# Patient Record
Sex: MALE | Race: WHITE | NOT HISPANIC OR LATINO | Employment: FULL TIME | URBAN - METROPOLITAN AREA
[De-identification: names, ages, dates, MRNs, and addresses within clinical notes are randomized per-mention and may not be internally consistent; named-entity substitution may affect disease eponyms.]

---

## 2018-11-20 ENCOUNTER — OFFICE VISIT (OUTPATIENT)
Dept: NEUROSURGERY | Facility: CLINIC | Age: 38
End: 2018-11-20

## 2018-11-20 VITALS
SYSTOLIC BLOOD PRESSURE: 130 MMHG | TEMPERATURE: 97.6 F | BODY MASS INDEX: 33.06 KG/M2 | WEIGHT: 223.2 LBS | HEIGHT: 69 IN | DIASTOLIC BLOOD PRESSURE: 88 MMHG

## 2018-11-20 DIAGNOSIS — R20.0 NUMBNESS AND TINGLING OF BOTH LEGS: ICD-10-CM

## 2018-11-20 DIAGNOSIS — R20.0 NUMBNESS OF FINGERS OF BOTH HANDS: Primary | ICD-10-CM

## 2018-11-20 DIAGNOSIS — R20.2 NUMBNESS AND TINGLING OF BOTH LEGS: ICD-10-CM

## 2018-11-20 PROCEDURE — 99203 OFFICE O/P NEW LOW 30 MIN: CPT | Performed by: NEUROLOGICAL SURGERY

## 2018-11-20 RX ORDER — PANTOPRAZOLE SODIUM 40 MG/1
40 TABLET, DELAYED RELEASE ORAL DAILY
COMMUNITY

## 2018-11-20 RX ORDER — ATOMOXETINE 80 MG/1
80 CAPSULE ORAL DAILY
COMMUNITY
End: 2019-02-04

## 2018-11-20 NOTE — PROGRESS NOTES
Eduardo Riley  1980  [id number]      No chief complaint on file.      HISTORY OF PRESENT ILLNESS:  This is a 37-year-old male who has a 9 month history of numbness and tingling in his upper and lower extremities.  He has pain in his hips but has no symptoms consistent with radiculopathy either in the upper or lower extremities.  He has no bowel bladder dysfunction.  He notes is sitting and standing for long periods of time tend to exacerbate his symptoms.  Cervical and lumbar MRI been performed and  he is referred for neurosurgical consultation.     Past Medical History:   Diagnosis Date   • Anxiety    • Environmental allergies    • Hypertension        Past Surgical History:   Procedure Laterality Date   • CHOLECYSTECTOMY     • TONSILLECTOMY         Family History   Problem Relation Age of Onset   • No Known Problems Mother    • Hypertension Father        Social History     Socioeconomic History   • Marital status: Single     Spouse name: Not on file   • Number of children: Not on file   • Years of education: Not on file   • Highest education level: Not on file   Social Needs   • Financial resource strain: Not on file   • Food insecurity - worry: Not on file   • Food insecurity - inability: Not on file   • Transportation needs - medical: Not on file   • Transportation needs - non-medical: Not on file   Occupational History   • Not on file   Tobacco Use   • Smoking status: Former Smoker   Substance and Sexual Activity   • Alcohol use: Yes     Alcohol/week: 10.8 oz     Types: 18 Cans of beer per week   • Drug use: No   • Sexual activity: Not on file   Other Topics Concern   • Not on file   Social History Narrative   • Not on file       No Known Allergies      Current Outpatient Medications:   •  atomoxetine (STRATTERA) 80 MG capsule, Take 80 mg by mouth Daily., Disp: , Rfl:   •  HYDROXYZINE PAMOATE PO, Take  by mouth., Disp: , Rfl:   •  loratadine (CLARITIN) 10 MG tablet, Take 10 mg by mouth Daily., Disp: , Rfl:    •  pantoprazole (PROTONIX) 40 MG EC tablet, Take 40 mg by mouth Daily., Disp: , Rfl:   •  valsartan (DIOVAN) 160 MG tablet, Take 160 mg by mouth Daily., Disp: , Rfl:   •  busPIRone (BUSPAR) 15 MG tablet, Take 15 mg by mouth 3 (Three) Times a Day., Disp: , Rfl:   •  escitalopram (LEXAPRO) 20 MG tablet, Take 20 mg by mouth Daily., Disp: , Rfl:   •  HYDROcodone-acetaminophen (NORCO) 5-325 MG per tablet, 1 po q 4-6 hrs prn pain, Disp: 20 tablet, Rfl: 0  •  MethylPREDNISolone (MEDROL, MILLY,) 4 MG tablet, Take as directed on package instructions., Disp: 21 tablet, Rfl: 0    Review of Systems   Constitutional: Negative for activity change, appetite change, chills, diaphoresis, fatigue, fever and unexpected weight change.   HENT: Negative for congestion, dental problem, drooling, ear discharge, ear pain, facial swelling, hearing loss, mouth sores, nosebleeds, postnasal drip, rhinorrhea, sinus pressure, sneezing, sore throat, tinnitus, trouble swallowing and voice change.    Eyes: Negative for photophobia, pain, discharge, redness, itching and visual disturbance.   Respiratory: Positive for apnea. Negative for cough, choking, chest tightness, shortness of breath, wheezing and stridor.    Cardiovascular: Negative for chest pain, palpitations and leg swelling.   Gastrointestinal: Negative for abdominal distention, abdominal pain, anal bleeding, blood in stool, constipation, diarrhea, nausea, rectal pain and vomiting.   Endocrine: Negative for cold intolerance, heat intolerance, polydipsia, polyphagia and polyuria.   Genitourinary: Negative for decreased urine volume, difficulty urinating, dysuria, enuresis, flank pain, frequency, genital sores, hematuria and urgency.   Musculoskeletal: Positive for arthralgias, back pain and myalgias. Negative for gait problem, joint swelling, neck pain and neck stiffness.   Skin: Negative for color change, pallor, rash and wound.   Allergic/Immunologic: Negative for environmental allergies,  food allergies and immunocompromised state.   Neurological: Positive for tremors, weakness and numbness. Negative for dizziness, seizures, syncope, facial asymmetry, speech difficulty, light-headedness and headaches.   Hematological: Negative for adenopathy. Does not bruise/bleed easily.   Psychiatric/Behavioral: Negative for agitation, behavioral problems, confusion, decreased concentration, dysphoric mood, hallucinations, self-injury, sleep disturbance and suicidal ideas. The patient is not nervous/anxious and is not hyperactive.        There were no vitals filed for this visit.    Neurological Examination:  Mental status/speech: The patient is alert and oriented.  Speech is clear without aphysia or dysarthria.  No overt cognitive deficits.    Cranial nerve examination:    Olfaction: Smell is intact.  Vision: Vision is intact without visual field abnormalities.  Funduscopic examination is normal.  No pupillary irregularity.  Ocular motor examination: The extraocular muscles are intact.  There is no diplopia.  The pupil is round and reactive to both light and accommodation.  There is no nystagmus.  Facial movement/sensation: There is no facial weakness.  Sensation is intact in the first, second, and third divisions of the trigeminal nerve.  The corneal reflex is intact.  Auditory: Hearing is intact to finger rub bilaterally.  Cranial nerves IX, X, XI, XII: Phonation is normal.  No dysphagia.  Tongue is protruded in the midline without atrophy.  The gag reflex is intact.  Shoulder shrug is normal.    Musculoligamentous ligamentous examination: There is no limitation in range of motion.  There is no weakness, sensory loss or reflex asymmetry.  His gait is normal.  No Babinski Lei or clonus.     Medical Decision Making:     Diagnostic Data Set:  I have reviewed the cervical and lumbar MRI in detail.  They both are normal.      Assessment:  Numbness in both upper and lower extremities etiology  undetermined          Recommendations:  I have scheduled EMG/NCV of both upper and lower extremities and a thoracic MRI.  Thus far I do not have an explanation for the symptoms that he has.  I do not find any evidence of any neurological abnormality on his examination.        I greatly appreciate the opportunity to see and evaluate this individual.  If you have questions or concerns regarding issues that I may have overlooked please call me at any time: [phone].  Hipolito Gavin M.D.  [facility]  [address] [place] [address]      Scribed for Rodrigo Gavin MD by Daniel Barney CMA. 11/20/2018  1:50 PM  I have read and concur with the information provided by the scribe.  Rodrigo Gavin MD

## 2018-12-04 ENCOUNTER — OFFICE VISIT (OUTPATIENT)
Dept: NEUROSURGERY | Facility: CLINIC | Age: 38
End: 2018-12-04

## 2018-12-04 ENCOUNTER — APPOINTMENT (OUTPATIENT)
Dept: MRI IMAGING | Facility: HOSPITAL | Age: 38
End: 2018-12-04
Attending: NEUROLOGICAL SURGERY

## 2018-12-04 ENCOUNTER — HOSPITAL ENCOUNTER (OUTPATIENT)
Dept: MRI IMAGING | Facility: HOSPITAL | Age: 38
Discharge: HOME | End: 2018-12-04
Attending: NEUROLOGICAL SURGERY | Admitting: NEUROLOGICAL SURGERY

## 2018-12-04 VITALS
HEIGHT: 69 IN | TEMPERATURE: 97.5 F | BODY MASS INDEX: 33.03 KG/M2 | SYSTOLIC BLOOD PRESSURE: 142 MMHG | DIASTOLIC BLOOD PRESSURE: 90 MMHG | WEIGHT: 223 LBS

## 2018-12-04 DIAGNOSIS — M51.34 DEGENERATIVE DISC DISEASE, THORACIC: Primary | ICD-10-CM

## 2018-12-04 DIAGNOSIS — R20.2 NUMBNESS AND TINGLING OF BOTH LEGS: ICD-10-CM

## 2018-12-04 DIAGNOSIS — R20.0 NUMBNESS AND TINGLING OF BOTH LEGS: ICD-10-CM

## 2018-12-04 DIAGNOSIS — R20.0 NUMBNESS OF FINGERS OF BOTH HANDS: ICD-10-CM

## 2018-12-04 PROCEDURE — 0 GADOBENATE DIMEGLUMINE 529 MG/ML SOLUTION: Performed by: NEUROLOGICAL SURGERY

## 2018-12-04 PROCEDURE — 72157 MRI CHEST SPINE W/O & W/DYE: CPT

## 2018-12-04 PROCEDURE — 99213 OFFICE O/P EST LOW 20 MIN: CPT | Performed by: NEUROLOGICAL SURGERY

## 2018-12-04 PROCEDURE — A9577 INJ MULTIHANCE: HCPCS | Performed by: NEUROLOGICAL SURGERY

## 2018-12-04 RX ORDER — NABUMETONE 750 MG/1
750 TABLET, FILM COATED ORAL 2 TIMES DAILY
Qty: 60 TABLET | Refills: 0 | Status: SHIPPED | OUTPATIENT
Start: 2018-12-04 | End: 2019-02-04

## 2018-12-04 RX ADMIN — GADOBENATE DIMEGLUMINE 20 ML: 529 INJECTION, SOLUTION INTRAVENOUS at 10:25

## 2018-12-04 NOTE — PROGRESS NOTES
Eduardo M Osvaldo  1980  [id number]                        CHIEF COMPLAINT:  .  Numbness in upper and lower extremities.]         MEDICAL HISTORY SINCE LAST ENCOUNTER:   [Numbness in his hands and legs; 37-year-old male reports for follow-up for continued evaluation of a 9 month history of numbness and tingling in his upper and lower extremities.  He had a EMG and NCV which was essentially normal showing no evidence of a peripheral neuropathy or entrapment neuropathy.  Thoracic MRI shows degenerative disc disease with bulging of intravertebral disc.  No abnormal signal within the spinal cord however           Past Medical History:   Diagnosis Date   • Anxiety    • Environmental allergies    • Hypertension               Past Surgical History:   Procedure Laterality Date   • CHOLECYSTECTOMY     • TONSILLECTOMY                Family History   Problem Relation Age of Onset   • No Known Problems Mother    • Hypertension Father               Social History     Socioeconomic History   • Marital status: Single     Spouse name: Not on file   • Number of children: Not on file   • Years of education: Not on file   • Highest education level: Not on file   Social Needs   • Financial resource strain: Not on file   • Food insecurity - worry: Not on file   • Food insecurity - inability: Not on file   • Transportation needs - medical: Not on file   • Transportation needs - non-medical: Not on file   Occupational History   • Not on file   Tobacco Use   • Smoking status: Former Smoker   Substance and Sexual Activity   • Alcohol use: Yes     Alcohol/week: 10.8 oz     Types: 18 Cans of beer per week   • Drug use: No   • Sexual activity: Not on file   Other Topics Concern   • Not on file   Social History Narrative   • Not on file            No Known Allergies           Current Outpatient Medications:   •  atomoxetine (STRATTERA) 80 MG capsule, Take 80 mg by mouth Daily., Disp: , Rfl:   •  busPIRone (BUSPAR) 15 MG tablet, Take 15 mg by  mouth 3 (Three) Times a Day., Disp: , Rfl:   •  escitalopram (LEXAPRO) 20 MG tablet, Take 20 mg by mouth Daily., Disp: , Rfl:   •  HYDROcodone-acetaminophen (NORCO) 5-325 MG per tablet, 1 po q 4-6 hrs prn pain, Disp: 20 tablet, Rfl: 0  •  HYDROXYZINE PAMOATE PO, Take  by mouth., Disp: , Rfl:   •  loratadine (CLARITIN) 10 MG tablet, Take 10 mg by mouth Daily., Disp: , Rfl:   •  MethylPREDNISolone (MEDROL, MILLY,) 4 MG tablet, Take as directed on package instructions., Disp: 21 tablet, Rfl: 0  •  pantoprazole (PROTONIX) 40 MG EC tablet, Take 40 mg by mouth Daily., Disp: , Rfl:   •  valsartan (DIOVAN) 160 MG tablet, Take 160 mg by mouth Daily., Disp: , Rfl:   No current facility-administered medications for this visit.          Review of Systems   Constitutional: Negative for activity change, appetite change, chills, diaphoresis, fatigue, fever and unexpected weight change.   HENT: Negative for congestion, dental problem, drooling, ear discharge, ear pain, facial swelling, hearing loss, mouth sores, nosebleeds, postnasal drip, rhinorrhea, sinus pressure, sneezing, sore throat, tinnitus, trouble swallowing and voice change.    Eyes: Negative for photophobia, pain, discharge, redness, itching and visual disturbance.   Respiratory: Negative for apnea, cough, choking, chest tightness, shortness of breath, wheezing and stridor.    Cardiovascular: Negative for chest pain, palpitations and leg swelling.   Gastrointestinal: Negative for abdominal distention, abdominal pain, anal bleeding, blood in stool, constipation, diarrhea, nausea, rectal pain and vomiting.   Endocrine: Negative for cold intolerance, heat intolerance, polydipsia, polyphagia and polyuria.   Genitourinary: Negative for decreased urine volume, difficulty urinating, dysuria, enuresis, flank pain, frequency, genital sores, hematuria and urgency.   Musculoskeletal: Negative for arthralgias, back pain, gait problem, joint swelling, myalgias, neck pain and neck  stiffness.   Skin: Negative for color change, pallor, rash and wound.   Allergic/Immunologic: Negative for environmental allergies, food allergies and immunocompromised state.   Neurological: Negative for dizziness, tremors, seizures, syncope, facial asymmetry, speech difficulty, weakness, light-headedness, numbness and headaches.   Hematological: Negative for adenopathy. Does not bruise/bleed easily.   Psychiatric/Behavioral: Negative for agitation, behavioral problems, confusion, decreased concentration, dysphoric mood, hallucinations, self-injury, sleep disturbance and suicidal ideas. The patient is not nervous/anxious and is not hyperactive.              There were no vitals filed for this visit.            EXAMINATION:  Unchanged            MEDICAL DECISION MAKING:  The studies as far do not provide anatomical/clinical correlation with his symptom complex.           ASSESSMENT/DISPOSITION:  Ordered a MRI of the brain for completeness sake to eliminate the possibility of disseminated sclerosis.              I APPRECIATE THE OPPORTUNITY OF THIS REFERRAL. PLEASE CALL IF ANY       QUESTIONS [phone]    Scribed for Rodrigo Gavin MD by Daniel Barney CMA. 12/4/2018  2:20 PM     I have read and concur with the information provided by the scribe.  Rodrigo Gavin MD

## 2018-12-31 ENCOUNTER — OFFICE VISIT (OUTPATIENT)
Dept: NEUROSURGERY | Facility: CLINIC | Age: 38
End: 2018-12-31

## 2018-12-31 VITALS
TEMPERATURE: 98.2 F | DIASTOLIC BLOOD PRESSURE: 90 MMHG | BODY MASS INDEX: 34.8 KG/M2 | WEIGHT: 235 LBS | HEIGHT: 69 IN | SYSTOLIC BLOOD PRESSURE: 142 MMHG

## 2018-12-31 DIAGNOSIS — R20.2 NUMBNESS AND TINGLING OF BOTH LEGS: Primary | ICD-10-CM

## 2018-12-31 DIAGNOSIS — M51.34 DEGENERATIVE DISC DISEASE, THORACIC: ICD-10-CM

## 2018-12-31 DIAGNOSIS — R20.0 NUMBNESS AND TINGLING OF BOTH LEGS: Primary | ICD-10-CM

## 2018-12-31 DIAGNOSIS — R20.0 NUMBNESS OF FINGERS OF BOTH HANDS: ICD-10-CM

## 2018-12-31 PROCEDURE — 99213 OFFICE O/P EST LOW 20 MIN: CPT | Performed by: NEUROLOGICAL SURGERY

## 2018-12-31 NOTE — PROGRESS NOTES
Eduardo Riley  1980  [id number]                        CHIEF COMPLAINT:   Numbness and upper and lower extremity         MEDICAL HISTORY SINCE LAST ENCOUNTER:  M terms have been unchanged.  I reviewed his diagnostic studies which have included cervical thoracic and lumbar MRI; MRI of the brain; EMG and NCV.  These have been unrevealing in the context of explain the symptoms that he has.  I can find no neurosurgical explanation.  Nevertheless his symptoms have persisted.            Past Medical History:   Diagnosis Date   • Anxiety    • Environmental allergies    • Hypertension               Past Surgical History:   Procedure Laterality Date   • CHOLECYSTECTOMY     • TONSILLECTOMY                Family History   Problem Relation Age of Onset   • No Known Problems Mother    • Hypertension Father               Social History     Socioeconomic History   • Marital status: Single     Spouse name: Not on file   • Number of children: Not on file   • Years of education: Not on file   • Highest education level: Not on file   Social Needs   • Financial resource strain: Not on file   • Food insecurity - worry: Not on file   • Food insecurity - inability: Not on file   • Transportation needs - medical: Not on file   • Transportation needs - non-medical: Not on file   Occupational History   • Not on file   Tobacco Use   • Smoking status: Former Smoker   Substance and Sexual Activity   • Alcohol use: Yes     Alcohol/week: 10.8 oz     Types: 18 Cans of beer per week   • Drug use: No   • Sexual activity: Not on file   Other Topics Concern   • Not on file   Social History Narrative   • Not on file            No Known Allergies           Current Outpatient Medications:   •  atomoxetine (STRATTERA) 80 MG capsule, Take 80 mg by mouth Daily., Disp: , Rfl:   •  busPIRone (BUSPAR) 15 MG tablet, Take 15 mg by mouth 3 (Three) Times a Day., Disp: , Rfl:   •  escitalopram (LEXAPRO) 20 MG tablet, Take 20 mg by mouth Daily., Disp: , Rfl:    •  HYDROcodone-acetaminophen (NORCO) 5-325 MG per tablet, 1 po q 4-6 hrs prn pain, Disp: 20 tablet, Rfl: 0  •  HYDROXYZINE PAMOATE PO, Take  by mouth., Disp: , Rfl:   •  loratadine (CLARITIN) 10 MG tablet, Take 10 mg by mouth Daily., Disp: , Rfl:   •  MethylPREDNISolone (MEDROL, MILLY,) 4 MG tablet, Take as directed on package instructions., Disp: 21 tablet, Rfl: 0  •  nabumetone (RELAFEN) 750 MG tablet, Take 1 tablet by mouth 2 (Two) Times a Day., Disp: 60 tablet, Rfl: 0  •  pantoprazole (PROTONIX) 40 MG EC tablet, Take 40 mg by mouth Daily., Disp: , Rfl:   •  valsartan (DIOVAN) 160 MG tablet, Take 160 mg by mouth Daily., Disp: , Rfl:          Review of Systems   Constitutional: Negative for activity change, appetite change, chills, diaphoresis, fatigue, fever and unexpected weight change.   HENT: Negative for congestion, dental problem, drooling, ear discharge, ear pain, facial swelling, hearing loss, mouth sores, nosebleeds, postnasal drip, rhinorrhea, sinus pressure, sneezing, sore throat, tinnitus, trouble swallowing and voice change.    Eyes: Negative for photophobia, pain, discharge, redness, itching and visual disturbance.   Respiratory: Negative for apnea, cough, choking, chest tightness, shortness of breath, wheezing and stridor.    Cardiovascular: Negative for chest pain, palpitations and leg swelling.   Gastrointestinal: Negative for abdominal distention, abdominal pain, anal bleeding, blood in stool, constipation, diarrhea, nausea, rectal pain and vomiting.   Endocrine: Negative for cold intolerance, heat intolerance, polydipsia, polyphagia and polyuria.   Genitourinary: Negative for decreased urine volume, difficulty urinating, dysuria, enuresis, flank pain, frequency, genital sores, hematuria and urgency.   Musculoskeletal: Negative for arthralgias, back pain, gait problem, joint swelling, myalgias, neck pain and neck stiffness.   Skin: Negative for color change, pallor, rash and wound.  "  Allergic/Immunologic: Negative for environmental allergies, food allergies and immunocompromised state.   Neurological: Positive for light-headedness and numbness. Negative for dizziness, tremors, seizures, syncope, facial asymmetry, speech difficulty, weakness and headaches.   Hematological: Negative for adenopathy. Does not bruise/bleed easily.   Psychiatric/Behavioral: Negative for agitation, behavioral problems, confusion, decreased concentration, dysphoric mood, hallucinations, self-injury, sleep disturbance and suicidal ideas. The patient is not nervous/anxious and is not hyperactive.    All other systems reviewed and are negative.              Vitals:    12/31/18 0859   BP: 142/90   Temp: 98.2 °F (36.8 °C)   TempSrc: Temporal   Weight: 107 kg (235 lb)   Height: 175.3 cm (69.02\")               EXAMINATION:  Examination remains essentially unchanged.  I'm unable to find focal weakness sensory loss or reflex asymmetry.  His gait is normal.            MEDICAL DECISION MAKING: Unable to provide an explanation for his symptoms from a neurosurgical perspective.           ASSESSMENT/DISPOSITION: Made him an appointment to see neurology in hopes that they may be able to explain his symptom complex.  Clearly, I find no justification or necessity for surgical intervention.              I APPRECIATE THE OPPORTUNITY OF THIS REFERRAL. PLEASE CALL IF ANY       QUESTIONS [phone]    Scribed for Rodrigo Gavin MD by Daniel Barney CMA. 12/31/2018 9:18 AM     I have read and concur with the information provided by the scribe.  Rodrigo Gavin MD    "

## 2019-02-04 ENCOUNTER — OFFICE VISIT (OUTPATIENT)
Dept: NEUROLOGY | Facility: CLINIC | Age: 39
End: 2019-02-04

## 2019-02-04 ENCOUNTER — LAB (OUTPATIENT)
Dept: LAB | Facility: HOSPITAL | Age: 39
End: 2019-02-04

## 2019-02-04 VITALS
HEIGHT: 69 IN | HEART RATE: 91 BPM | OXYGEN SATURATION: 99 % | WEIGHT: 238 LBS | DIASTOLIC BLOOD PRESSURE: 82 MMHG | BODY MASS INDEX: 35.25 KG/M2 | SYSTOLIC BLOOD PRESSURE: 124 MMHG

## 2019-02-04 DIAGNOSIS — F41.9 ANXIETY: ICD-10-CM

## 2019-02-04 DIAGNOSIS — R20.2 PARESTHESIA: Primary | ICD-10-CM

## 2019-02-04 DIAGNOSIS — R20.2 PARESTHESIA: ICD-10-CM

## 2019-02-04 DIAGNOSIS — R90.82 WHITE MATTER ABNORMALITY ON MRI OF BRAIN: ICD-10-CM

## 2019-02-04 LAB
FOLATE SERPL-MCNC: 23.81 NG/ML (ref 3.2–20)
TSH SERPL DL<=0.05 MIU/L-ACNC: 2.53 MIU/ML (ref 0.35–5.35)
VIT B12 BLD-MCNC: 482 PG/ML (ref 211–911)

## 2019-02-04 PROCEDURE — 99203 OFFICE O/P NEW LOW 30 MIN: CPT | Performed by: NURSE PRACTITIONER

## 2019-02-04 PROCEDURE — 36415 COLL VENOUS BLD VENIPUNCTURE: CPT

## 2019-02-04 PROCEDURE — 84443 ASSAY THYROID STIM HORMONE: CPT

## 2019-02-04 PROCEDURE — 86255 FLUORESCENT ANTIBODY SCREEN: CPT | Performed by: NURSE PRACTITIONER

## 2019-02-04 PROCEDURE — 82607 VITAMIN B-12: CPT

## 2019-02-04 PROCEDURE — 82746 ASSAY OF FOLIC ACID SERUM: CPT

## 2019-02-04 PROCEDURE — 83921 ORGANIC ACID SINGLE QUANT: CPT

## 2019-02-04 RX ORDER — SERTRALINE HYDROCHLORIDE 25 MG/1
25 TABLET, FILM COATED ORAL DAILY
Qty: 30 TABLET | Refills: 2 | Status: SHIPPED | OUTPATIENT
Start: 2019-02-04 | End: 2019-05-17 | Stop reason: SDUPTHER

## 2019-02-04 NOTE — PROGRESS NOTES
"Subjective:     Patient ID: Eduardo Riley is a 38 y.o. male.    CC:   Chief Complaint   Patient presents with   • Numbness       HPI:   History of Present Illness     Mr Riley is a 38-year-old male here today for initial neurological evaluation with complaints of widespread paresthesias.  He tells me he first began to notice numbness and tingling in his lower extremities in March 2018, at that time he has symptoms were described as his legs and feet tingling from the waist down as if they were sleep, this began to move into his arms and head after a couple months which prompted workup with PCP including lumbar spine MRI.  He was subsequently referred to one of our neurosurgeons where he had MRI of the lumbar, cervical and thoracic spine which were read as unremarkable per neurosurgery report.  Dr. Gavin also obtained MRI of the brain in November 2018 to rule out demyelinating disorder.  MRI report was read as 3 areas of increased T2 focus in the periventricular white matter which was read as nonspecific.  Nerve conduction studies performed by Dr. Paniagua read as mild carpal tunnel, he is status post repair bilateral upper extremities.  Otherwise unremarkable.  He reports he continues to have paresthesias, they're fairly constant in the lower extremities and intermittent in the upper extremities as well as the top of the head and the back of the head.  He denies any paresthesias in the chest wall, face or mouth.  He denies any visual symptoms.  He denies headaches, seizure activity, syncope.  He has dizziness at times but this is rare.  He denies that his legs feel weak however he reports that they feel \"fatigued.  He cannot discern whether his symptoms seem worse with heat.  He denies any bowel or bladder disturbances  No family history of any neurological disorders that he is aware of  He does have mild hypertension, he admits that he does have anxiety, he has been on several medications in the past including Effexor, " Lexapro and BuSpar.  He is a previous smoker, he quit 10 years ago, he reportedly only smoked about 4 cigarettes a day for about 6 years.    The following portions of the patient's history were reviewed and updated as appropriate: allergies, current medications, past family history, past medical history, past social history, past surgical history and problem list.    Past Medical History:   Diagnosis Date   • Anxiety    • CTS (carpal tunnel syndrome)    • Environmental allergies    • Hypertension    • Sleep apnea        Past Surgical History:   Procedure Laterality Date   • CARPAL TUNNEL RELEASE     • CHOLECYSTECTOMY     • TONSILLECTOMY         Social History     Socioeconomic History   • Marital status: Single     Spouse name: Not on file   • Number of children: Not on file   • Years of education: Not on file   • Highest education level: Not on file   Social Needs   • Financial resource strain: Not on file   • Food insecurity - worry: Not on file   • Food insecurity - inability: Not on file   • Transportation needs - medical: Not on file   • Transportation needs - non-medical: Not on file   Occupational History   • Not on file   Tobacco Use   • Smoking status: Former Smoker   • Smokeless tobacco: Never Used   Substance and Sexual Activity   • Alcohol use: Yes     Alcohol/week: 10.8 oz     Types: 18 Cans of beer per week   • Drug use: No   • Sexual activity: Not on file   Other Topics Concern   • Not on file   Social History Narrative   • Not on file       Family History   Problem Relation Age of Onset   • No Known Problems Mother    • Hypertension Father    • Diabetes Maternal Grandmother    • Cancer Maternal Grandfather         Review of Systems   Constitutional: Negative.    HENT: Negative.    Eyes: Negative.    Respiratory: Negative.    Cardiovascular: Negative.    Gastrointestinal: Negative.    Endocrine: Negative.    Genitourinary: Negative.    Musculoskeletal: Negative.    Skin: Negative.     Allergic/Immunologic: Negative.    Neurological: Positive for numbness (Pt ststes it has been occuring since 03/18, sometime occurs throughout entire body but mainly hands, feet, and legs.). Negative for dizziness, tremors, seizures, syncope, facial asymmetry, speech difficulty, weakness, light-headedness and headaches.   Hematological: Negative.    Psychiatric/Behavioral: Positive for dysphoric mood. Negative for agitation, behavioral problems, decreased concentration, sleep disturbance and suicidal ideas. The patient is nervous/anxious.         Objective:    Neurologic Exam     Mental Status   Oriented to person, place, and time.   Attention: normal. Concentration: normal.   Speech: speech is normal   Level of consciousness: alert  Knowledge: consistent with education.   Able to read. Able to write. Normal comprehension.   Seems a bit anxious     Cranial Nerves   Cranial nerves II through XII intact.     CN II   Visual fields full to confrontation.   Right visual field deficit: none  Left visual field deficit: none     CN III, IV, VI   Pupils are equal, round, and reactive to light.  Extraocular motions are normal.   Right pupil: Size: 3 mm. Shape: regular. Reactivity: brisk. Consensual response: intact. Accommodation: intact.   Left pupil: Size: 3 mm. Shape: regular. Reactivity: brisk. Consensual response: intact. Accommodation: intact.   CN III: no CN III palsy  CN VI: no CN VI palsy  Nystagmus: none   Upgaze: normal  Downgaze: normal    CN V   Facial sensation intact.     CN VII   Facial expression full, symmetric.     CN VIII   CN VIII normal.     CN IX, X   CN IX normal.   CN X normal.     CN XI   CN XI normal.     CN XII   CN XII normal.   Tongue: not atrophic  Fasciculations: absent  Tongue deviation: none    Motor Exam   Muscle bulk: normal  Overall muscle tone: normal  Right arm tone: normal  Left arm tone: normal  Right arm pronator drift: absent  Left arm pronator drift: absent  Right leg tone:  normal  Left leg tone: normal    Strength   Strength 5/5 throughout.     Sensory Exam   Light touch normal.   Vibration normal.   Proprioception normal.   Pinprick normal.     Gait, Coordination, and Reflexes     Gait  Gait: normal    Coordination   Romberg: negative  Finger to nose coordination: normal  Heel to shin coordination: normal  Tandem walking coordination: normal    Tremor   Resting tremor: absent  Intention tremor: absent  Action tremor: absent    Reflexes   Reflexes 2+ except as noted.   Right plantar: normal  Left plantar: normal  Right Abel: absent  Left Abel: absent  Right ankle clonus: absent  Left ankle clonus: absent      Physical Exam   Constitutional: He is oriented to person, place, and time. He appears well-developed and well-nourished. No distress.   HENT:   Head: Normocephalic and atraumatic.   Eyes: EOM are normal. Pupils are equal, round, and reactive to light.   Neck: Normal range of motion.   Cardiovascular: Normal rate.   Pulmonary/Chest: Effort normal.   Neurological: He is oriented to person, place, and time. He has normal strength. He has a normal Finger-Nose-Finger Test, a normal Heel to Shin Test, a normal Romberg Test and a normal Tandem Gait Test. Gait normal.   Skin: Skin is warm. Capillary refill takes less than 2 seconds.   Psychiatric: His speech is normal.   Vitals reviewed.      Assessment/Plan:       Eduardo was seen today for numbness.    Diagnoses and all orders for this visit:    Paresthesia  -     Methylmalonic Acid, Serum; Future  -     Vitamin B12; Future  -     Folate; Future  -     TSH; Future  -     Neuromyelitis Optica (NMO) Auto Antibody, IgG    Anxiety  -     sertraline (ZOLOFT) 25 MG tablet; Take 1 tablet by mouth Daily.    White matter abnormality on MRI of brain  -     Neuromyelitis Optica (NMO) Auto Antibody, IgG    Mr. Riley has complaints of widespread tingling sensation from the waist down, also in his arms, hands and the top and back of his head.   MRI of the cervical, thoracic and lumbar spine have been performed and reviewed by Dr. Gavin he feels that this would not be contributing to his symptoms.  MRI of the brain with and without contrast was obtained, he does have 3 areas of white matter change, 2 on the left periventricular region one on the right on my review.  And would like to get labs as noted above including an MI.  We've discussed possibility of lumbar puncture as he is concerned he may have possible sclerosis.  We will review the above labs and discuss further workup after labs are obtained.  I will also have Dr. Kemp review his MRI scans as well.  There is a possibility his paresthesias are coming from his anxiety, he admits that he has fairly significant anxiety.  I'm going to go ahead and start him on some Zoloft, he'll start with 25 mg daily may increase to 50 mg after 2 weeks if tolerated well with no side effect.  I will see him back in about 3-4 weeks or sooner if needed, he'll call the office with any questions or concerns prior to follow-up appointment  Reviewed medications, potential side effects and signs and symptoms to report. Discussed risk versus benefits of treatment plan with patient and/or family-including medications, labs and radiology that may be ordered. Addressed questions and concerns during visit. Patient and/or family verbalized understanding and agree with plan.  During this visit the following were done:  Labs Reviewed []    Labs Ordered [x]    Radiology Reports Reviewed [x]    Radiology Ordered []    PCP Records Reviewed []    Referring Provider Records Reviewed [x]    ER Records Reviewed []    Hospital Records Reviewed []    History Obtained From Family []    Radiology Images Reviewed [x]   Images and referring notes reviewed personally  Other Reviewed []    Records Requested []      EMR Dragon/Transcription Disclaimer:  Much of this encounter note is an electronic transcription of spoken language to printed  text. Electronic transcription of spoken language may permit erroneous words or phrases to be inadvertently transcribed. Although I have reviewed the note for such errors, some may still exist in this documentation.           Anita Irvin, APRN  2/4/2019

## 2019-02-07 LAB
Lab: NORMAL
METHYLMALONATE SERPL-SCNC: 152 NMOL/L (ref 0–378)

## 2019-02-18 ENCOUNTER — TELEPHONE (OUTPATIENT)
Dept: NEUROLOGY | Facility: CLINIC | Age: 39
End: 2019-02-18

## 2019-02-18 NOTE — TELEPHONE ENCOUNTER
----- Message from SOLANGE Sanford sent at 2/17/2019  9:31 PM EST -----  Please let him know labs so far unremarkable  One send out lab is still pending

## 2019-02-19 LAB — REF LAB TEST RESULTS: NORMAL

## 2019-02-19 NOTE — TELEPHONE ENCOUNTER
PATIENT CALLED BACK AND I GAVE HIM THE RESULTS AND HE STATED THAT IF YOU CALL AGAIN, HE GETS LUNCH AT 10:30

## 2019-02-26 NOTE — PROGRESS NOTES
Please let him know his NMO test was negative.  I did discuss case with Dr. Kemp and he also reviewed the MRI of the brain.  There were only a small amount of white matter changes however given his symptoms and his age I would like to set up a lumbar puncture to do further testing to rule out demyelinating disorders and evaluate the cerebrospinal fluid.    I would also like to get some further blood work, if he would like to do this as well I can put the order in and he can stop at any [facility] draw station to have these drawn

## 2019-02-27 ENCOUNTER — TELEPHONE (OUTPATIENT)
Dept: NEUROLOGY | Facility: CLINIC | Age: 39
End: 2019-02-27

## 2019-02-27 NOTE — TELEPHONE ENCOUNTER
----- Message from SOLANGE Sanford sent at 2/26/2019  7:56 AM EST -----  Please let him know his NMO test was negative.  I did discuss case with Dr. Kemp and he also reviewed the MRI of the brain.  There were only a small amount of white matter changes however given his symptoms and his age I would like to set up a lumbar puncture to do further testing to rule out demyelinating disorders and evaluate the cerebrospinal fluid.    I would also like to get some further blood work, if he would like to do this as well I can put the order in and he can stop at any [facility] draw station to have these drawn

## 2019-03-12 ENCOUNTER — LAB (OUTPATIENT)
Dept: LAB | Facility: HOSPITAL | Age: 39
End: 2019-03-12

## 2019-03-12 DIAGNOSIS — R90.82 WHITE MATTER ABNORMALITY ON MRI OF BRAIN: ICD-10-CM

## 2019-03-12 DIAGNOSIS — R90.82 WHITE MATTER ABNORMALITY ON MRI OF BRAIN: Primary | ICD-10-CM

## 2019-03-12 LAB
ALBUMIN SERPL-MCNC: 4.82 G/DL (ref 3.2–4.8)
ALBUMIN/GLOB SERPL: 1.9 G/DL (ref 1.5–2.5)
ALP SERPL-CCNC: 47 U/L (ref 25–100)
ALT SERPL W P-5'-P-CCNC: 82 U/L (ref 7–40)
ANION GAP SERPL CALCULATED.3IONS-SCNC: 12 MMOL/L (ref 3–11)
AST SERPL-CCNC: 44 U/L (ref 0–33)
BASOPHILS # BLD AUTO: 0.03 10*3/MM3 (ref 0–0.2)
BASOPHILS NFR BLD AUTO: 0.5 % (ref 0–1)
BILIRUB SERPL-MCNC: 0.5 MG/DL (ref 0.3–1.2)
BUN BLD-MCNC: 26 MG/DL (ref 9–23)
BUN/CREAT SERPL: 23.4 (ref 7–25)
CALCIUM SPEC-SCNC: 9.4 MG/DL (ref 8.7–10.4)
CHLORIDE SERPL-SCNC: 99 MMOL/L (ref 99–109)
CO2 SERPL-SCNC: 27 MMOL/L (ref 20–31)
CREAT BLD-MCNC: 1.11 MG/DL (ref 0.6–1.3)
DEPRECATED RDW RBC AUTO: 41.3 FL (ref 37–54)
EOSINOPHIL # BLD AUTO: 0.11 10*3/MM3 (ref 0–0.3)
EOSINOPHIL NFR BLD AUTO: 1.8 % (ref 0–3)
ERYTHROCYTE [DISTWIDTH] IN BLOOD BY AUTOMATED COUNT: 12.4 % (ref 11.3–14.5)
GFR SERPL CREATININE-BSD FRML MDRD: 74 ML/MIN/1.73
GLOBULIN UR ELPH-MCNC: 2.6 GM/DL
GLUCOSE BLD-MCNC: 76 MG/DL (ref 70–100)
HCT VFR BLD AUTO: 46.9 % (ref 38.9–50.9)
HGB BLD-MCNC: 15.4 G/DL (ref 13.1–17.5)
IMM GRANULOCYTES # BLD AUTO: 0.03 10*3/MM3 (ref 0–0.05)
IMM GRANULOCYTES NFR BLD AUTO: 0.5 % (ref 0–0.6)
LYMPHOCYTES # BLD AUTO: 2.31 10*3/MM3 (ref 0.6–4.8)
LYMPHOCYTES NFR BLD AUTO: 37.1 % (ref 24–44)
MCH RBC QN AUTO: 29.7 PG (ref 27–31)
MCHC RBC AUTO-ENTMCNC: 32.8 G/DL (ref 32–36)
MCV RBC AUTO: 90.5 FL (ref 80–99)
MONOCYTES # BLD AUTO: 0.43 10*3/MM3 (ref 0–1)
MONOCYTES NFR BLD AUTO: 6.9 % (ref 0–12)
NEUTROPHILS # BLD AUTO: 3.32 10*3/MM3 (ref 1.5–8.3)
NEUTROPHILS NFR BLD AUTO: 53.2 % (ref 41–71)
PLATELET # BLD AUTO: 289 10*3/MM3 (ref 150–450)
PMV BLD AUTO: 10 FL (ref 6–12)
POTASSIUM BLD-SCNC: 4.6 MMOL/L (ref 3.5–5.5)
PROT SERPL-MCNC: 7.4 G/DL (ref 5.7–8.2)
RBC # BLD AUTO: 5.18 10*6/MM3 (ref 4.2–5.76)
SODIUM BLD-SCNC: 138 MMOL/L (ref 132–146)
WBC NRBC COR # BLD: 6.23 10*3/MM3 (ref 3.5–10.8)

## 2019-03-12 PROCEDURE — 86235 NUCLEAR ANTIGEN ANTIBODY: CPT

## 2019-03-12 PROCEDURE — 36415 COLL VENOUS BLD VENIPUNCTURE: CPT

## 2019-03-12 PROCEDURE — 86618 LYME DISEASE ANTIBODY: CPT

## 2019-03-12 PROCEDURE — 85025 COMPLETE CBC W/AUTO DIFF WBC: CPT

## 2019-03-12 PROCEDURE — 86225 DNA ANTIBODY NATIVE: CPT

## 2019-03-12 PROCEDURE — 80053 COMPREHEN METABOLIC PANEL: CPT

## 2019-03-12 PROCEDURE — 86038 ANTINUCLEAR ANTIBODIES: CPT

## 2019-03-14 LAB
B BURGDOR IGG+IGM SER-ACNC: <0.91 ISR (ref 0–0.9)
B BURGDOR IGM SER-ACNC: <0.8 INDEX (ref 0–0.79)

## 2019-03-18 LAB
ANA HOMOGEN TITR SER: ABNORMAL {TITER}
ANA SER QL IA: POSITIVE
ANA SPECKLED TITR SER: ABNORMAL {TITER}
CENTROMERE B AB SER-ACNC: <0.2 AI (ref 0–0.9)
CHROMATIN AB SERPL-ACNC: <0.2 AI (ref 0–0.9)
DSDNA AB SER-ACNC: 1 IU/ML (ref 0–9)
ENA JO1 AB SER-ACNC: <0.2 AI (ref 0–0.9)
ENA RNP AB SER-ACNC: <0.2 AI (ref 0–0.9)
ENA SCL70 AB SER-ACNC: <0.2 AI (ref 0–0.9)
ENA SM AB SER-ACNC: <0.2 AI (ref 0–0.9)
ENA SS-A AB SER-ACNC: <0.2 AI (ref 0–0.9)
ENA SS-B AB SER-ACNC: <0.2 AI (ref 0–0.9)
Lab: ABNORMAL

## 2019-03-21 DIAGNOSIS — R79.9 ABNORMAL BLOOD FINDING: Primary | ICD-10-CM

## 2019-03-21 NOTE — PROGRESS NOTES
Let him know his RENAN (marker some autoimmune issues) is positive HOWEVER, our lab is using a new method and all I have recently drawn have been positive so this may be an error.  I am reordering RENAN, he can have drawn at his continence.  othewise his liver enzymes are elevated; he needs to touch base with his PCP on this.  Fax copy to his PCP

## 2019-04-05 ENCOUNTER — HOSPITAL ENCOUNTER (OUTPATIENT)
Dept: GENERAL RADIOLOGY | Facility: HOSPITAL | Age: 39
Discharge: HOME | End: 2019-04-05
Admitting: PHYSICIAN ASSISTANT

## 2019-04-05 VITALS
OXYGEN SATURATION: 100 % | RESPIRATION RATE: 18 BRPM | SYSTOLIC BLOOD PRESSURE: 133 MMHG | WEIGHT: 246.8 LBS | HEART RATE: 92 BPM | BODY MASS INDEX: 36.56 KG/M2 | TEMPERATURE: 97.7 F | HEIGHT: 69 IN | DIASTOLIC BLOOD PRESSURE: 85 MMHG

## 2019-04-05 DIAGNOSIS — R90.82 WHITE MATTER ABNORMALITY ON MRI OF BRAIN: ICD-10-CM

## 2019-04-05 LAB
APPEARANCE CSF: CLEAR
COLOR CSF: COLORLESS
COLOR CSF: COLORLESS
COLOR SPUN CSF: COLORLESS
COLOR SPUN CSF: COLORLESS
GLUCOSE CSF-MCNC: 63 MG/DL (ref 40–70)
PROT CSF-MCNC: 53 MG/DL (ref 15–45)
RBC # CSF MANUAL: 1 /MM3 (ref 0–5)
RBC # CSF MANUAL: 4 /MM3 (ref 0–5)
SPECIMEN VOL CSF: 8 ML
SPECIMEN VOL CSF: 8 ML
TUBE # CSF: 1
TUBE # CSF: 4
WBC # CSF MANUAL: 0 /MM3 (ref 0–5)
WBC # CSF MANUAL: 0 /MM3 (ref 0–5)

## 2019-04-05 PROCEDURE — 82040 ASSAY OF SERUM ALBUMIN: CPT | Performed by: NURSE PRACTITIONER

## 2019-04-05 PROCEDURE — 82042 OTHER SOURCE ALBUMIN QUAN EA: CPT | Performed by: NURSE PRACTITIONER

## 2019-04-05 PROCEDURE — 77003 FLUOROGUIDE FOR SPINE INJECT: CPT

## 2019-04-05 PROCEDURE — 87015 SPECIMEN INFECT AGNT CONCNTJ: CPT | Performed by: NURSE PRACTITIONER

## 2019-04-05 PROCEDURE — 87076 CULTURE ANAEROBE IDENT EACH: CPT | Performed by: NURSE PRACTITIONER

## 2019-04-05 PROCEDURE — 36415 COLL VENOUS BLD VENIPUNCTURE: CPT | Performed by: NURSE PRACTITIONER

## 2019-04-05 PROCEDURE — 82945 GLUCOSE OTHER FLUID: CPT | Performed by: NURSE PRACTITIONER

## 2019-04-05 PROCEDURE — 88112 CYTOPATH CELL ENHANCE TECH: CPT | Performed by: NURSE PRACTITIONER

## 2019-04-05 PROCEDURE — 83916 OLIGOCLONAL BANDS: CPT | Performed by: NURSE PRACTITIONER

## 2019-04-05 PROCEDURE — 87205 SMEAR GRAM STAIN: CPT | Performed by: NURSE PRACTITIONER

## 2019-04-05 PROCEDURE — 86038 ANTINUCLEAR ANTIBODIES: CPT | Performed by: NURSE PRACTITIONER

## 2019-04-05 PROCEDURE — 89050 BODY FLUID CELL COUNT: CPT | Performed by: NURSE PRACTITIONER

## 2019-04-05 PROCEDURE — 84157 ASSAY OF PROTEIN OTHER: CPT | Performed by: NURSE PRACTITIONER

## 2019-04-05 PROCEDURE — 83873 ASSAY OF CSF PROTEIN: CPT | Performed by: NURSE PRACTITIONER

## 2019-04-05 PROCEDURE — 87070 CULTURE OTHR SPECIMN AEROBIC: CPT | Performed by: NURSE PRACTITIONER

## 2019-04-05 PROCEDURE — 82784 ASSAY IGA/IGD/IGG/IGM EACH: CPT | Performed by: NURSE PRACTITIONER

## 2019-04-05 RX ORDER — LIDOCAINE HYDROCHLORIDE 10 MG/ML
10 INJECTION, SOLUTION EPIDURAL; INFILTRATION; INTRACAUDAL; PERINEURAL ONCE
Status: COMPLETED | OUTPATIENT
Start: 2019-04-05 | End: 2019-04-05

## 2019-04-05 RX ORDER — FENOFIBRATE 160 MG/1
160 TABLET ORAL DAILY
COMMUNITY

## 2019-04-05 RX ORDER — NABUMETONE 750 MG/1
750 TABLET, FILM COATED ORAL DAILY
COMMUNITY

## 2019-04-05 RX ADMIN — LIDOCAINE HYDROCHLORIDE 10 ML: 10 INJECTION, SOLUTION EPIDURAL; INFILTRATION; INTRACAUDAL; PERINEURAL at 09:54

## 2019-04-05 NOTE — NURSING NOTE
Pt discharged to home s/p LP.  Pt tolerated procedure without complications.  Discharge instructions reviewed with patient who verbalized understanding.  Pt transported to exit via wheelchair per CNA.

## 2019-04-05 NOTE — POST-PROCEDURE NOTE
Radiology Procedure    Pre-procedure: procedure, risks discussed with patient. Patient indicated understanding and consented to procedure     Procedure Performed: lumbar puncture    IV Sedation and/or Anesthesia:  No    Complications: none    Preliminary Findings: opening pressure 15cm H2O, closing pressure 10cm H2O    Specimen Removed: 8 cc clear, colorless CSF    Estimated Blood Loss:  0ml    Post-Procedure Diagnosis: pending    Post-Procedure Plan: encourage fluids, bed rest x 2 hours    Standard Discharge Instructions Given:yes     STEFANY Chavarria  04/05/19  9:44 AM

## 2019-04-08 ENCOUNTER — TELEPHONE (OUTPATIENT)
Dept: INFUSION THERAPY | Facility: HOSPITAL | Age: 39
End: 2019-04-08

## 2019-04-08 LAB
ANA SER QL: NEGATIVE
LAB AP CASE REPORT: NORMAL
PATH REPORT.FINAL DX SPEC: NORMAL

## 2019-04-09 LAB
ALB CSF/SERPL: 8 {RATIO} (ref 0–8)
ALBUMIN CSF-MCNC: 34 MG/DL (ref 11–48)
ALBUMIN SERPL-MCNC: 4.4 G/DL (ref 3.5–5.5)
IGG CSF-MCNC: 4.3 MG/DL (ref 0–8.6)
IGG SERPL-MCNC: 1142 MG/DL (ref 700–1600)
IGG SYNTH RATE SER+CSF CALC-MRATE: -2.3 MG/DAY
IGG/ALB CLEAR SER+CSF-RTO: 0.5 (ref 0–0.7)
IGG/ALB CSF: 0.13 {RATIO} (ref 0–0.25)
MBP CSF-MCNC: 3.2 NG/ML (ref 0–1.2)
OLIGOCLONAL BANDS.IT SER+CSF QL: ABNORMAL

## 2019-04-11 ENCOUNTER — TELEPHONE (OUTPATIENT)
Dept: NEUROLOGY | Facility: CLINIC | Age: 39
End: 2019-04-11

## 2019-04-11 NOTE — PROGRESS NOTES
Please let him know his LP labs showed one test abnormal but this is not for negative for demyelinating diseases.  One culture did come back abnormal today, I discussed with Dr. Kemp at 810 am who suggested since there was no white blood cells present this was contaminant of specimen and needs no other workup given the fact that he has not been having headaches or fevers.  Dr. Kemp did suggest that we have him see Dr. Calixto if he has continued symptoms of paresthesias, is he okay with this

## 2019-04-11 NOTE — TELEPHONE ENCOUNTER
Received a critical lab value from the lab stating that the patient was gram positive. Informed Anita Irvin.

## 2019-04-12 ENCOUNTER — TELEPHONE (OUTPATIENT)
Dept: NEUROLOGY | Facility: CLINIC | Age: 39
End: 2019-04-12

## 2019-04-12 LAB
BACTERIA SPEC AEROBE CULT: ABNORMAL
GRAM STN SPEC: ABNORMAL

## 2019-04-12 NOTE — TELEPHONE ENCOUNTER
----- Message from SOLANGE Sanford sent at 4/11/2019  4:08 PM EDT -----  Please let him know his LP labs showed one test abnormal but this is not for negative for demyelinating diseases.  One culture did come back abnormal today, I discussed with Dr. Kemp at 810 am who suggested since there was no white blood cells present this was contaminant of specimen and needs no other workup given the fact that he has not been having headaches or fevers.  Dr. Kemp did suggest that we have him see Dr. Calxito if he has continued symptoms of paresthesias, is he okay with this

## 2019-04-15 NOTE — PROGRESS NOTES
Spoke with Dr. Ashby, culture report likely contaminate as this is a skin contaminate as pt has no fever, stiff neck or headaches  Fill to chart

## 2019-04-16 DIAGNOSIS — R20.2 PARESTHESIA: ICD-10-CM

## 2019-04-16 DIAGNOSIS — R90.82 WHITE MATTER ABNORMALITY ON MRI OF BRAIN: Primary | ICD-10-CM

## 2019-05-17 ENCOUNTER — OFFICE VISIT (OUTPATIENT)
Dept: NEUROLOGY | Facility: CLINIC | Age: 39
End: 2019-05-17

## 2019-05-17 VITALS
BODY MASS INDEX: 36.29 KG/M2 | OXYGEN SATURATION: 98 % | HEART RATE: 61 BPM | WEIGHT: 245 LBS | SYSTOLIC BLOOD PRESSURE: 132 MMHG | HEIGHT: 69 IN | DIASTOLIC BLOOD PRESSURE: 78 MMHG

## 2019-05-17 DIAGNOSIS — R20.2 NUMBNESS AND TINGLING OF BOTH LEGS: Primary | ICD-10-CM

## 2019-05-17 DIAGNOSIS — F33.9 RECURRENT MAJOR DEPRESSIVE DISORDER, REMISSION STATUS UNSPECIFIED (HCC): ICD-10-CM

## 2019-05-17 DIAGNOSIS — R20.0 NUMBNESS AND TINGLING OF BOTH LEGS: Primary | ICD-10-CM

## 2019-05-17 DIAGNOSIS — F41.9 ANXIETY: ICD-10-CM

## 2019-05-17 DIAGNOSIS — R20.0 NUMBNESS OF FINGERS OF BOTH HANDS: ICD-10-CM

## 2019-05-17 PROCEDURE — 99215 OFFICE O/P EST HI 40 MIN: CPT | Performed by: PSYCHIATRY & NEUROLOGY

## 2019-05-17 RX ORDER — PRAMIPEXOLE DIHYDROCHLORIDE 0.25 MG/1
TABLET ORAL
Qty: 180 TABLET | Refills: 5 | Status: SHIPPED | OUTPATIENT
Start: 2019-05-17 | End: 2019-06-03

## 2019-05-17 RX ORDER — TESTOSTERONE CYPIONATE 200 MG/ML
INJECTION, SOLUTION INTRAMUSCULAR
Refills: 5 | COMMUNITY
Start: 2019-04-15

## 2019-05-17 NOTE — ASSESSMENT & PLAN NOTE
Unusual constellation of sx of N/T in all extremities  And right sided stiffness and circumduction right leg    Check FERMÍN and paraneoplastic ab    Start Mirapex for sx

## 2019-05-17 NOTE — ASSESSMENT & PLAN NOTE
Psychological condition is worsening.  Medication changes per orders.  Psychological condition  will be reassessed at the next regular appointment.

## 2019-05-17 NOTE — PROGRESS NOTES
"Subjective   Patient ID: Eduardo Riley is a 38 y.o. male     Chief Complaint   Patient presents with   • Numbness        History of Present Illness    38 y.o. male referred by Georgia Irvin for paresthesias.  A year ago developed sensation of extremities falling asleep.  Muscles feel tight and sore.  No modifying factors.  Mild intensity.      Started on Zoloft but no benefit.      Describes feeling LH, sinus pressure remains constant.  Hips hurt all the time.   Did PT and notice legs were tight.      Reviewed medical records:    March 2018 noticed N/T in LE.  Tingling from the waist down.  Sx moved to arms and head.      MRI C/T/L unremarkable.   MRI Brain 3 T2 PVWM abnl  NMO - neg  CSF - prot 53,   EMG/NCS Mike UE, R LE  - mild B CTS   RENAN positive; homogenous speckled 1:160  OR  Past Medical History:   Diagnosis Date   • Anxiety    • Arthritis    • CTS (carpal tunnel syndrome)    • Environmental allergies    • GERD (gastroesophageal reflux disease)    • Hypertension    • Sleep apnea     cpap at home   • Tingling sensation     generalized; pt reports \"all over\"     Family History   Problem Relation Age of Onset   • No Known Problems Mother    • Hypertension Father    • Diabetes Maternal Grandmother    • Cancer Maternal Grandfather      Social History     Socioeconomic History   • Marital status: Single     Spouse name: Not on file   • Number of children: Not on file   • Years of education: Not on file   • Highest education level: Not on file   Tobacco Use   • Smoking status: Former Smoker   • Smokeless tobacco: Never Used   Substance and Sexual Activity   • Alcohol use: Yes     Alcohol/week: 10.8 oz     Types: 18 Cans of beer per week   • Drug use: No       Review of Systems   Constitutional: Negative for activity change, fatigue and unexpected weight change.   HENT: Negative for facial swelling, hearing loss, tinnitus, trouble swallowing and voice change.    Eyes: Negative for photophobia, pain and visual " "disturbance.   Respiratory: Negative for apnea, cough and choking.    Cardiovascular: Negative for chest pain.   Gastrointestinal: Negative for constipation, nausea and vomiting.   Endocrine: Negative for cold intolerance.   Genitourinary: Negative for difficulty urinating, frequency and urgency.   Musculoskeletal: Negative for arthralgias, back pain, gait problem, myalgias, neck pain and neck stiffness.   Skin: Negative for rash.   Allergic/Immunologic: Negative for immunocompromised state.   Neurological: Positive for numbness. Negative for dizziness, tremors, seizures, syncope, facial asymmetry, speech difficulty, weakness, light-headedness and headaches.   Hematological: Negative for adenopathy.   Psychiatric/Behavioral: Negative for confusion, decreased concentration, hallucinations and sleep disturbance. The patient is not nervous/anxious.        Objective     Vitals:    05/17/19 1524   BP: 132/78   BP Location: Right arm   Patient Position: Sitting   Cuff Size: Adult   Pulse: 61   SpO2: 98%   Weight: 111 kg (245 lb)   Height: 175.3 cm (69\")       Neurologic Exam     Mental Status   Oriented to person, place, and time.   Registration: recalls 3 of 3 objects. Recall at 5 minutes: recalls 3 of 3 objects. Follows 3 step commands.   Attention: normal. Concentration: normal.   Speech: speech is normal   Level of consciousness: alert  Knowledge: good and consistent with education.   Able to name object. Able to read. Able to repeat. Able to write. Normal comprehension.     Cranial Nerves     CN II   Visual fields full to confrontation.   Visual acuity: normal  Right visual field deficit: none  Left visual field deficit: none     CN III, IV, VI   Pupils are equal, round, and reactive to light.  Extraocular motions are normal.   Right pupil: Shape: regular. Reactivity: brisk. Consensual response: intact.   Left pupil: Shape: regular. Reactivity: brisk. Consensual response: intact.   Nystagmus: none   Diplopia: " none  Ophthalmoparesis: none  Upgaze: normal  Downgaze: normal  Conjugate gaze: present  Vestibulo-ocular reflex: present    CN V   Facial sensation intact.   Right corneal reflex: normal  Left corneal reflex: normal    CN VII   Right facial weakness: none  Left facial weakness: none    CN VIII   Hearing: intact    CN IX, X   Palate: symmetric  Right gag reflex: normal  Left gag reflex: normal    CN XI   Right sternocleidomastoid strength: normal  Left sternocleidomastoid strength: normal    CN XII   Tongue: not atrophic  Fasciculations: absent  Tongue deviation: none    Motor Exam   Muscle bulk: normal  Overall muscle tone: normal  Right arm tone: normal  Left arm tone: normal  Right leg tone: normal  Left leg tone: normal    Strength   Strength 5/5 throughout.     Sensory Exam   Light touch normal.   Vibration normal.   Proprioception normal.   Pinprick normal.     Gait, Coordination, and Reflexes     Gait  Gait: circumduction (right leg )    Coordination   Romberg: negative  Finger to nose coordination: normal  Heel to shin coordination: normal  Tandem walking coordination: normal    Tremor   Resting tremor: absent  Intention tremor: absent  Action tremor: absent    Reflexes   Reflexes 2+ except as noted. Decreased arm swing on R arm        Physical Exam   Constitutional: He is oriented to person, place, and time. Vital signs are normal. He appears well-developed and well-nourished.   HENT:   Head: Normocephalic and atraumatic.   Eyes: EOM and lids are normal. Pupils are equal, round, and reactive to light.   Fundoscopic exam:       The right eye shows no exudate, no hemorrhage and no papilledema. The right eye shows venous pulsations.        The left eye shows no exudate, no hemorrhage and no papilledema. The left eye shows venous pulsations.   Neck: Normal range of motion and phonation normal. Normal carotid pulses present. Carotid bruit is not present. No thyroid mass and no thyromegaly present.    Cardiovascular: Normal rate, regular rhythm and normal heart sounds.   Pulmonary/Chest: Effort normal.   Neurological: He is oriented to person, place, and time. He has normal strength. He has a normal Finger-Nose-Finger Test, a normal Heel to Shin Test, a normal Romberg Test and a normal Tandem Gait Test.   Skin: Skin is warm and dry.   Psychiatric: He has a normal mood and affect. His speech is normal.   Nursing note and vitals reviewed.      Hospital Outpatient Visit on 04/05/2019   Component Date Value Ref Range Status   • CSF Culture 04/05/2019 Cutibacterium acnes*  Final    SEEN ON THE SMEAR OF BROTH CULTURE   • Gram Stain 04/05/2019 No WBCs or organisms seen   Final   • Glucose, CSF 04/05/2019 63  40 - 70 mg/dL Final   • IgG, CSF 04/05/2019 4.3  0.0 - 8.6 mg/dL Final   • Albumin, CSF 04/05/2019 34  11 - 48 mg/dL Final   • Albumin 04/05/2019 4.4  3.5 - 5.5 g/dL Final   • IgG 04/05/2019 1142  700 - 1600 mg/dL Final   • IgG/Alb Ratio, CSF 04/05/2019 0.13  0.00 - 0.25 Final   • IgG Index, CSF 04/05/2019 0.5  0.0 - 0.7 Final   • IgG Synthesis Rate, CSF 04/05/2019   -2.3  -9.9 TO +3.3 mg/day Final   • Myelin Basic Protein, CSF 04/05/2019 3.2* 0.0 - 1.2 ng/mL Final    Results for this test are for research purposes only by the assay's  .  The performance characteristics of this product have  not been established.  Results should not be used as a diagnostic  procedure without confirmation of the diagnosis by another medically  established diagnostic product or procedure.   • Oligoclonal Bands, CSF 04/05/2019 Comment   Final    Comment: Zero (0) oligoclonal bands were observed in the CSF.  Interpretation:   Criteria for Positivity: Four (4) or more oligoclonal bands observed   only in the CSF have been shown to be most consistent with MS   using our method. [Solange AS, Cosmo EL, Jose BG, and   Gentry JA: Cerebrospinal Fluid Oligoclonal Bands in the Diagnosis   of Multiple Sclerosis. Am J Clin  Pathol 120(5):672-675, 2003].   Oligoclonal bands that are present only in the CSF have been   associated with a variety of inflammatory brain diseases such as   multiple sclerosis (MS), subacute encephalitis, neurosyphilis, etc.   Increased IgG in the CSF is not specific for MS, but is an indication   of chronic neural inflammation. Clinical correlation indicated.   Approximately 2-3% of clinically confirmed MS patients show little   or no evidence of oligoclonal bands in the CSF; however oligoclonal   bands may develop as the disease progresses.   Oligoclonal Banding testing performed using Isoelectric Focusing                              (IEF) and immunoblotting methodology.   • CSF/Serum Albumin Index 04/05/2019 8  0 - 8 Final             Relationship to blood-brain barrier:            Consistent with an intact barrier        <9            Slight impairment                   9 -  14            Moderate impairment                14 -  30            Severe impairment                  30 - 100            Complete breakdown                     >100   • Case Report 04/05/2019    Final                    Value:Non-gynecologic Cytology                          Case: [id number]                                  Authorizing Provider:  Anita Irvin    Collected:           04/05/2019 09:53 AM                                 APRN                                                                         Ordering Location:     [facility]   Received:            04/05/2019 10:41 AM                                 XRAY                                                                         Pathologist:           Nancy De Leon DO                                                        Specimen:    CSF [place]                                                                             • Final Diagnosis 04/05/2019    Final                    Value:This result contains rich text formatting which cannot  be displayed here.   • Protein, Total (CSF) 04/05/2019 53.0* 15.0 - 45.0 mg/dL Final   • WBC, CSF 04/05/2019 0  0 - 5 /mm3 Final   • RBC, CSF 04/05/2019 4  0 - 5 /mm3 Final   • Color, CSF 04/05/2019 Colorless  Colorless Final   • Supernatant Color, CSF 04/05/2019 Colorless  Colorless Final   • Appearance, CSF 04/05/2019 Clear  Clear Final   • Volume, CSF 04/05/2019 8.0  mL Final   • Tube Number, CSF 04/05/2019 1   Final   • WBC, CSF 04/05/2019 0  0 - 5 /mm3 Final   • RBC, CSF 04/05/2019 1  0 - 5 /mm3 Final   • Color, CSF 04/05/2019 Colorless  Colorless Final   • Supernatant Color, CSF 04/05/2019 Colorless  Colorless Final   • Volume, CSF 04/05/2019 8.0  mL Final   • Tube Number, CSF 04/05/2019 4   Final         Assessment/Plan     Problem List Items Addressed This Visit        Nervous and Auditory    Numbness of fingers of both hands    Current Assessment & Plan     Unusual constellation of sx of N/T in all extremities  And right sided stiffness and circumduction right leg    Check FERMÍN and paraneoplastic ab    Start Mirapex for sx          Numbness and tingling of both legs - Primary       Other    Recurrent major depressive disorder (CMS/HCC)    Current Assessment & Plan     Psychological condition is worsening.  Medication changes per orders.  Psychological condition  will be reassessed at the next regular appointment.         Relevant Medications    sertraline (ZOLOFT) 50 MG tablet      Other Visit Diagnoses     Anxiety        Relevant Medications    sertraline (ZOLOFT) 50 MG tablet             No Follow-up on file.

## 2019-06-03 ENCOUNTER — LAB (OUTPATIENT)
Dept: LAB | Facility: HOSPITAL | Age: 39
End: 2019-06-03

## 2019-06-03 ENCOUNTER — OFFICE VISIT (OUTPATIENT)
Dept: NEUROLOGY | Facility: CLINIC | Age: 39
End: 2019-06-03

## 2019-06-03 VITALS
HEART RATE: 68 BPM | OXYGEN SATURATION: 99 % | HEIGHT: 69 IN | BODY MASS INDEX: 35.55 KG/M2 | SYSTOLIC BLOOD PRESSURE: 148 MMHG | RESPIRATION RATE: 16 BRPM | WEIGHT: 240 LBS | DIASTOLIC BLOOD PRESSURE: 96 MMHG

## 2019-06-03 DIAGNOSIS — R20.0 NUMBNESS AND TINGLING OF BOTH LEGS: ICD-10-CM

## 2019-06-03 DIAGNOSIS — R20.2 NUMBNESS AND TINGLING OF BOTH LEGS: ICD-10-CM

## 2019-06-03 DIAGNOSIS — R20.0 NUMBNESS OF FINGERS OF BOTH HANDS: Primary | ICD-10-CM

## 2019-06-03 PROCEDURE — 86341 ISLET CELL ANTIBODY: CPT

## 2019-06-03 PROCEDURE — 99213 OFFICE O/P EST LOW 20 MIN: CPT | Performed by: PSYCHIATRY & NEUROLOGY

## 2019-06-03 PROCEDURE — 36415 COLL VENOUS BLD VENIPUNCTURE: CPT

## 2019-06-03 RX ORDER — GABAPENTIN 300 MG/1
300-600 CAPSULE ORAL 2 TIMES DAILY
Qty: 60 CAPSULE | Refills: 3 | Status: SHIPPED | OUTPATIENT
Start: 2019-06-03 | End: 2019-07-29

## 2019-06-03 NOTE — PROGRESS NOTES
"Subjective   Patient ID: Eduardo Riley is a 38 y.o. male     Chief Complaint   Patient presents with   • Numbness        History of Present Illness    38 y.o. male returns in follow up for paresthesias.  Last visit on 5/17/19 ordered labs, started Mirapex.     Mirapex increasing anxiety.     Problem history:    A year ago developed sensation of extremities falling asleep.  Muscles feel tight and sore.  No modifying factors.  Mild intensity.      Started on Zoloft but no benefit.      Describes feeling LH, sinus pressure remains constant.  Hips hurt all the time.   Did PT and notice legs were tight.      Reviewed medical records:    March 2018 noticed N/T in LE.  Tingling from the waist down.  Sx moved to arms and head.      MRI C/T/L unremarkable.   MRI Brain 3 T2 PVWM abnl  NMO - neg  CSF - prot 53,   EMG/NCS Mike UE, R LE  - mild B CTS   RENAN positive; homogenous speckled 1:160  OR  Past Medical History:   Diagnosis Date   • Anxiety    • Arthritis    • CTS (carpal tunnel syndrome)    • Environmental allergies    • GERD (gastroesophageal reflux disease)    • Hypertension    • Sleep apnea     cpap at home   • Tingling sensation     generalized; pt reports \"all over\"     Family History   Problem Relation Age of Onset   • No Known Problems Mother    • Hypertension Father    • Diabetes Maternal Grandmother    • Cancer Maternal Grandfather      Social History     Socioeconomic History   • Marital status: Single     Spouse name: Not on file   • Number of children: Not on file   • Years of education: Not on file   • Highest education level: Not on file   Tobacco Use   • Smoking status: Former Smoker   • Smokeless tobacco: Never Used   Substance and Sexual Activity   • Alcohol use: Yes     Alcohol/week: 10.8 oz     Types: 18 Cans of beer per week   • Drug use: No       Review of Systems   Constitutional: Negative for activity change, fatigue and unexpected weight change.   HENT: Negative for facial swelling, hearing " "loss, tinnitus, trouble swallowing and voice change.    Eyes: Negative for photophobia, pain and visual disturbance.   Respiratory: Negative for apnea, cough and choking.    Cardiovascular: Negative for chest pain.   Gastrointestinal: Negative for constipation, nausea and vomiting.   Endocrine: Negative for cold intolerance.   Genitourinary: Negative for difficulty urinating, frequency and urgency.   Musculoskeletal: Positive for myalgias. Negative for arthralgias, back pain, gait problem, neck pain and neck stiffness.   Skin: Negative for rash.   Allergic/Immunologic: Negative for immunocompromised state.   Neurological: Positive for numbness. Negative for dizziness, tremors, seizures, syncope, facial asymmetry, speech difficulty, weakness, light-headedness and headaches.   Hematological: Negative for adenopathy.   Psychiatric/Behavioral: Negative for confusion, decreased concentration, hallucinations and sleep disturbance. The patient is not nervous/anxious.        Objective     Vitals:    06/03/19 1528   BP: 148/96   BP Location: Left arm   Patient Position: Sitting   Cuff Size: Adult   Pulse: 68   Resp: 16   SpO2: 99%   Weight: 109 kg (240 lb)   Height: 175.3 cm (69\")       Neurologic Exam     Mental Status   Registration: recalls 3 of 3 objects. Recall at 5 minutes: recalls 3 of 3 objects. Follows 3 step commands.   Attention: normal. Concentration: normal.   Level of consciousness: alert  Knowledge: good and consistent with education.   Able to name object. Able to read. Able to repeat. Able to write. Normal comprehension.     Cranial Nerves     CN II   Visual fields full to confrontation.   Visual acuity: normal  Right visual field deficit: none  Left visual field deficit: none     CN III, IV, VI   Right pupil: Shape: regular. Reactivity: brisk. Consensual response: intact.   Left pupil: Shape: regular. Reactivity: brisk. Consensual response: intact.   Nystagmus: none   Diplopia: none  Ophthalmoparesis: " none  Upgaze: normal  Downgaze: normal  Conjugate gaze: present  Vestibulo-ocular reflex: present    CN V   Facial sensation intact.   Right corneal reflex: normal  Left corneal reflex: normal    CN VII   Right facial weakness: none  Left facial weakness: none    CN VIII   Hearing: intact    CN IX, X   Palate: symmetric  Right gag reflex: normal  Left gag reflex: normal    CN XI   Right sternocleidomastoid strength: normal  Left sternocleidomastoid strength: normal    CN XII   Tongue: not atrophic  Fasciculations: absent  Tongue deviation: none    Motor Exam   Muscle bulk: normal  Overall muscle tone: normal  Right arm tone: normal  Left arm tone: normal  Right leg tone: normal  Left leg tone: normal    Sensory Exam   Light touch normal.   Vibration normal.   Proprioception normal.   Pinprick normal.     Gait, Coordination, and Reflexes     Gait  Gait: circumduction (right leg )    Tremor   Resting tremor: absent  Intention tremor: absent  Action tremor: absent    Reflexes   Reflexes 2+ except as noted. Decreased arm swing on R arm        Physical Exam   Constitutional: He appears well-developed and well-nourished.   Nursing note and vitals reviewed.      Hospital Outpatient Visit on 04/05/2019   Component Date Value Ref Range Status   • CSF Culture 04/05/2019 Cutibacterium acnes*  Final    SEEN ON THE SMEAR OF BROTH CULTURE   • Gram Stain 04/05/2019 No WBCs or organisms seen   Final   • Glucose, CSF 04/05/2019 63  40 - 70 mg/dL Final   • IgG, CSF 04/05/2019 4.3  0.0 - 8.6 mg/dL Final   • Albumin, CSF 04/05/2019 34  11 - 48 mg/dL Final   • Albumin 04/05/2019 4.4  3.5 - 5.5 g/dL Final   • IgG 04/05/2019 1142  700 - 1600 mg/dL Final   • IgG/Alb Ratio, CSF 04/05/2019 0.13  0.00 - 0.25 Final   • IgG Index, CSF 04/05/2019 0.5  0.0 - 0.7 Final   • IgG Synthesis Rate, CSF 04/05/2019   -2.3  -9.9 TO +3.3 mg/day Final   • Myelin Basic Protein, CSF 04/05/2019 3.2* 0.0 - 1.2 ng/mL Final    Results for this test are for  research purposes only by the assay's  .  The performance characteristics of this product have  not been established.  Results should not be used as a diagnostic  procedure without confirmation of the diagnosis by another medically  established diagnostic product or procedure.   • Oligoclonal Bands, CSF 04/05/2019 Comment   Final    Comment: Zero (0) oligoclonal bands were observed in the CSF.  Interpretation:   Criteria for Positivity: Four (4) or more oligoclonal bands observed   only in the CSF have been shown to be most consistent with MS   using our method. [Solange AS, Cosmo EL, Jsoe VALENZUELA, and   Gentry JA: Cerebrospinal Fluid Oligoclonal Bands in the Diagnosis   of Multiple Sclerosis. Am J Clin Pathol 120(5):672-675, 2003].   Oligoclonal bands that are present only in the CSF have been   associated with a variety of inflammatory brain diseases such as   multiple sclerosis (MS), subacute encephalitis, neurosyphilis, etc.   Increased IgG in the CSF is not specific for MS, but is an indication   of chronic neural inflammation. Clinical correlation indicated.   Approximately 2-3% of clinically confirmed MS patients show little   or no evidence of oligoclonal bands in the CSF; however oligoclonal   bands may develop as the disease progresses.   Oligoclonal Banding testing performed using Isoelectric Focusing                              (IEF) and immunoblotting methodology.   • CSF/Serum Albumin Index 04/05/2019 8  0 - 8 Final             Relationship to blood-brain barrier:            Consistent with an intact barrier        <9            Slight impairment                   9 -  14            Moderate impairment                14 -  30            Severe impairment                  30 - 100            Complete breakdown                     >100   • Case Report 04/05/2019    Final                    Value:Non-gynecologic Cytology                          Case: [id number]                                   Authorizing Provider:  Anita Irvin    Collected:           04/05/2019 09:53 AM                                 APRN                                                                         Ordering Location:     [facility]   Received:            04/05/2019 10:41 AM                                 XRAY                                                                         Pathologist:           Nancy De Leon DO                                                        Specimen:    CSF [place]                                                                             • Final Diagnosis 04/05/2019    Final                    Value:This result contains rich text formatting which cannot be displayed here.   • Protein, Total (CSF) 04/05/2019 53.0* 15.0 - 45.0 mg/dL Final   • WBC, CSF 04/05/2019 0  0 - 5 /mm3 Final   • RBC, CSF 04/05/2019 4  0 - 5 /mm3 Final   • Color, CSF 04/05/2019 Colorless  Colorless Final   • Supernatant Color, CSF 04/05/2019 Colorless  Colorless Final   • Appearance, CSF 04/05/2019 Clear  Clear Final   • Volume, CSF 04/05/2019 8.0  mL Final   • Tube Number, CSF 04/05/2019 1   Final   • WBC, CSF 04/05/2019 0  0 - 5 /mm3 Final   • RBC, CSF 04/05/2019 1  0 - 5 /mm3 Final   • Color, CSF 04/05/2019 Colorless  Colorless Final   • Supernatant Color, CSF 04/05/2019 Colorless  Colorless Final   • Volume, CSF 04/05/2019 8.0  mL Final   • Tube Number, CSF 04/05/2019 4   Final         Assessment/Plan     Problem List Items Addressed This Visit        Nervous and Auditory    Numbness of fingers of both hands - Primary    Current Assessment & Plan     Sx continue without new or worsening sx.     Stop Mirapex    Add  - 600 mg qhs              Numbness and tingling of both legs             No Follow-up on file.

## 2019-06-06 LAB — GAD65 AB SER-ACNC: <5 U/ML (ref 0–5)

## 2019-07-29 ENCOUNTER — OFFICE VISIT (OUTPATIENT)
Dept: NEUROLOGY | Facility: CLINIC | Age: 39
End: 2019-07-29

## 2019-07-29 VITALS
WEIGHT: 236 LBS | HEART RATE: 76 BPM | RESPIRATION RATE: 16 BRPM | HEIGHT: 69 IN | DIASTOLIC BLOOD PRESSURE: 88 MMHG | SYSTOLIC BLOOD PRESSURE: 124 MMHG | BODY MASS INDEX: 34.96 KG/M2 | OXYGEN SATURATION: 98 %

## 2019-07-29 DIAGNOSIS — R20.0 NUMBNESS AND TINGLING OF BOTH LEGS: ICD-10-CM

## 2019-07-29 DIAGNOSIS — R20.2 NUMBNESS AND TINGLING OF BOTH LEGS: ICD-10-CM

## 2019-07-29 DIAGNOSIS — R20.0 NUMBNESS OF FINGERS OF BOTH HANDS: Primary | ICD-10-CM

## 2019-07-29 PROCEDURE — 99213 OFFICE O/P EST LOW 20 MIN: CPT | Performed by: PSYCHIATRY & NEUROLOGY

## 2019-07-29 NOTE — PROGRESS NOTES
"Subjective   Patient ID: Eduardo Riley is a 38 y.o. male     Chief Complaint   Patient presents with   • Numbness        History of Present Illness    38 y.o. male returns in follow up for paresthesias.  Last visit on 6/3/19  stopped Mirapex and add GBP.     GBP tolerating but no help in sx.  Extremities with constant N/T.  Moderate intensity.  Right foot hurting and difficult to walk upon.      Problem history:    A year ago developed sensation of extremities falling asleep.  Muscles feel tight and sore.  No modifying factors.  Mild intensity.      Started on Zoloft but no benefit.      Describes feeling LH, sinus pressure remains constant.  Hips hurt all the time.   Did PT and notice legs were tight.      Reviewed medical records:    March 2018 noticed N/T in LE.  Tingling from the waist down.  Sx moved to arms and head.      MRI C/T/L unremarkable.   MRI Brain 3 T2 PVWM abnl  NMO - neg  CSF - prot 53,   EMG/NCS Mike UE, R LE  - mild B CTS   RENAN positive; homogenous speckled 1:160  OR  Past Medical History:   Diagnosis Date   • Anxiety    • Arthritis    • CTS (carpal tunnel syndrome)    • Environmental allergies    • GERD (gastroesophageal reflux disease)    • Hypertension    • Sleep apnea     cpap at home   • Tingling sensation     generalized; pt reports \"all over\"     Family History   Problem Relation Age of Onset   • No Known Problems Mother    • Hypertension Father    • Diabetes Maternal Grandmother    • Cancer Maternal Grandfather      Social History     Socioeconomic History   • Marital status: Single     Spouse name: Not on file   • Number of children: Not on file   • Years of education: Not on file   • Highest education level: Not on file   Tobacco Use   • Smoking status: Former Smoker   • Smokeless tobacco: Never Used   Substance and Sexual Activity   • Alcohol use: Yes     Alcohol/week: 10.8 oz     Types: 18 Cans of beer per week   • Drug use: No       Review of Systems   Constitutional: Negative " "for activity change, fatigue and unexpected weight change.   HENT: Negative for facial swelling, hearing loss, tinnitus, trouble swallowing and voice change.    Eyes: Negative for photophobia, pain and visual disturbance.   Respiratory: Negative for apnea, cough and choking.    Cardiovascular: Negative for chest pain.   Gastrointestinal: Negative for constipation, nausea and vomiting.   Endocrine: Negative for cold intolerance.   Genitourinary: Negative for difficulty urinating, frequency and urgency.   Musculoskeletal: Positive for myalgias. Negative for arthralgias, back pain, gait problem, neck pain and neck stiffness.   Skin: Negative for rash.   Allergic/Immunologic: Negative for immunocompromised state.   Neurological: Positive for numbness. Negative for dizziness, tremors, seizures, syncope, facial asymmetry, speech difficulty, weakness, light-headedness and headaches.   Hematological: Negative for adenopathy.   Psychiatric/Behavioral: Negative for confusion, decreased concentration, hallucinations and sleep disturbance. The patient is not nervous/anxious.        Objective     Vitals:    07/29/19 1522   BP: 124/88   BP Location: Left arm   Patient Position: Sitting   Cuff Size: Adult   Pulse: 76   Resp: 16   SpO2: 98%   Weight: 107 kg (236 lb)   Height: 175.3 cm (69\")       Neurologic Exam     Mental Status   Registration: recalls 3 of 3 objects. Recall at 5 minutes: recalls 3 of 3 objects. Follows 3 step commands.   Attention: normal. Concentration: normal.   Level of consciousness: alert  Knowledge: good and consistent with education.   Able to name object. Able to read. Able to repeat. Able to write. Normal comprehension.     Cranial Nerves     CN II   Visual fields full to confrontation.   Visual acuity: normal  Right visual field deficit: none  Left visual field deficit: none     CN III, IV, VI   Right pupil: Shape: regular. Reactivity: brisk. Consensual response: intact.   Left pupil: Shape: regular. " Reactivity: brisk. Consensual response: intact.   Nystagmus: none   Diplopia: none  Ophthalmoparesis: none  Upgaze: normal  Downgaze: normal  Conjugate gaze: present  Vestibulo-ocular reflex: present    CN V   Facial sensation intact.   Right corneal reflex: normal  Left corneal reflex: normal    CN VII   Right facial weakness: none  Left facial weakness: none    CN VIII   Hearing: intact    CN IX, X   Palate: symmetric  Right gag reflex: normal  Left gag reflex: normal    CN XI   Right sternocleidomastoid strength: normal  Left sternocleidomastoid strength: normal    CN XII   Tongue: not atrophic  Fasciculations: absent  Tongue deviation: none    Motor Exam   Muscle bulk: normal  Overall muscle tone: normal  Right arm tone: normal  Left arm tone: normal  Right leg tone: normal  Left leg tone: normal    Sensory Exam   Light touch normal.   Vibration normal.   Proprioception normal.   Pinprick normal.     Gait, Coordination, and Reflexes     Gait  Gait: circumduction (right leg )    Tremor   Resting tremor: absent  Intention tremor: absent  Action tremor: absent    Reflexes   Reflexes 2+ except as noted. Decreased arm swing on R arm        Physical Exam   Constitutional: He appears well-developed and well-nourished.   Nursing note and vitals reviewed.      Lab on 06/03/2019   Component Date Value Ref Range Status   • FERMÍN-65 06/03/2019 <5.0  0.0 - 5.0 U/mL Final         Assessment/Plan     Problem List Items Addressed This Visit        Nervous and Auditory    Numbness of fingers of both hands - Primary    Current Assessment & Plan     Stop GBP    Start Sinemet 25/100 one tab TID          Numbness and tingling of both legs             No Follow-up on file.

## 2019-09-09 ENCOUNTER — OFFICE VISIT (OUTPATIENT)
Dept: NEUROLOGY | Facility: CLINIC | Age: 39
End: 2019-09-09

## 2019-09-09 VITALS
RESPIRATION RATE: 16 BRPM | OXYGEN SATURATION: 98 % | BODY MASS INDEX: 35.1 KG/M2 | HEIGHT: 69 IN | DIASTOLIC BLOOD PRESSURE: 94 MMHG | SYSTOLIC BLOOD PRESSURE: 136 MMHG | HEART RATE: 98 BPM | WEIGHT: 237 LBS

## 2019-09-09 DIAGNOSIS — R20.0 NUMBNESS AND TINGLING OF BOTH LEGS: Primary | ICD-10-CM

## 2019-09-09 DIAGNOSIS — R20.2 NUMBNESS AND TINGLING OF BOTH LEGS: Primary | ICD-10-CM

## 2019-09-09 DIAGNOSIS — R20.0 NUMBNESS OF FINGERS OF BOTH HANDS: ICD-10-CM

## 2019-09-09 PROCEDURE — 99213 OFFICE O/P EST LOW 20 MIN: CPT | Performed by: PSYCHIATRY & NEUROLOGY

## 2019-09-09 NOTE — PROGRESS NOTES
"Subjective   Patient ID: Eduardo Riley is a 38 y.o. male     Chief Complaint   Patient presents with   • Numbness     Fingers of both hands        History of Present Illness    38 y.o. male returns in follow up for paresthesias.  Last visit on 7/29/19  stopped GBP and started Sinemet.     Continues to feel nervousness all over.  Unsure if sinemet is helping sx.     Right foot hurting and difficult to walk upon placed in boot due to tendon injury.      Muscle soreness and tightness improved.  Hip not hurting.     Problem history:    A year ago developed sensation of extremities falling asleep.  Muscles feel tight and sore.  No modifying factors.  Mild intensity.      Started on Zoloft but no benefit.      Describes feeling LH, sinus pressure remains constant.  Hips hurt all the time.   Did PT and notice legs were tight.      Reviewed medical records:    March 2018 noticed N/T in LE.  Tingling from the waist down.  Sx moved to arms and head.      MRI C/T/L unremarkable.   MRI Brain 3 T2 PVWM abnl  NMO - neg  CSF - prot 53,   EMG/NCS Mike UE, R LE  - mild B CTS   RENAN positive; homogenous speckled 1:160     Past Medical History:   Diagnosis Date   • Anxiety    • Arthritis    • CTS (carpal tunnel syndrome)    • Environmental allergies    • GERD (gastroesophageal reflux disease)    • Hypertension    • Sleep apnea     cpap at home   • Tingling sensation     generalized; pt reports \"all over\"     Family History   Problem Relation Age of Onset   • No Known Problems Mother    • Hypertension Father    • Diabetes Maternal Grandmother    • Cancer Maternal Grandfather      Social History     Socioeconomic History   • Marital status: Single     Spouse name: Not on file   • Number of children: Not on file   • Years of education: Not on file   • Highest education level: Not on file   Tobacco Use   • Smoking status: Former Smoker   • Smokeless tobacco: Never Used   Substance and Sexual Activity   • Alcohol use: Yes     " "Alcohol/week: 10.8 oz     Types: 18 Cans of beer per week   • Drug use: No       Review of Systems   Constitutional: Negative for activity change, fatigue and unexpected weight change.   HENT: Negative for facial swelling, hearing loss, tinnitus, trouble swallowing and voice change.    Eyes: Negative for photophobia, pain and visual disturbance.   Respiratory: Negative for apnea, cough and choking.    Cardiovascular: Negative for chest pain.   Gastrointestinal: Negative for constipation, nausea and vomiting.   Endocrine: Negative for cold intolerance.   Genitourinary: Negative for difficulty urinating, frequency and urgency.   Musculoskeletal: Positive for myalgias. Negative for arthralgias, back pain, gait problem, neck pain and neck stiffness.   Skin: Negative for rash.   Allergic/Immunologic: Negative for immunocompromised state.   Neurological: Positive for numbness. Negative for dizziness, tremors, seizures, syncope, facial asymmetry, speech difficulty, weakness, light-headedness and headaches.   Hematological: Negative for adenopathy.   Psychiatric/Behavioral: Negative for confusion, decreased concentration, hallucinations and sleep disturbance. The patient is not nervous/anxious.        Objective     Vitals:    09/09/19 1326   BP: 136/94   BP Location: Left arm   Patient Position: Sitting   Cuff Size: Adult   Pulse: 98   Resp: 16   SpO2: 98%   Weight: 108 kg (237 lb)   Height: 175.3 cm (69\")       Neurologic Exam     Mental Status   Registration: recalls 3 of 3 objects. Recall at 5 minutes: recalls 3 of 3 objects. Follows 3 step commands.   Attention: normal. Concentration: normal.   Level of consciousness: alert  Knowledge: good and consistent with education.   Able to name object. Able to read. Able to repeat. Able to write. Normal comprehension.     Cranial Nerves     CN II   Visual fields full to confrontation.   Visual acuity: normal  Right visual field deficit: none  Left visual field deficit: none "     CN III, IV, VI   Right pupil: Shape: regular. Reactivity: brisk. Consensual response: intact.   Left pupil: Shape: regular. Reactivity: brisk. Consensual response: intact.   Nystagmus: none   Diplopia: none  Ophthalmoparesis: none  Upgaze: normal  Downgaze: normal  Conjugate gaze: present  Vestibulo-ocular reflex: present    CN V   Facial sensation intact.   Right corneal reflex: normal  Left corneal reflex: normal    CN VII   Right facial weakness: none  Left facial weakness: none    CN VIII   Hearing: intact    CN IX, X   Palate: symmetric  Right gag reflex: normal  Left gag reflex: normal    CN XI   Right sternocleidomastoid strength: normal  Left sternocleidomastoid strength: normal    CN XII   Tongue: not atrophic  Fasciculations: absent  Tongue deviation: none    Motor Exam   Muscle bulk: normal  Overall muscle tone: normal  Right arm tone: normal  Left arm tone: normal  Right leg tone: normal  Left leg tone: normal    Sensory Exam   Light touch normal.   Vibration normal.   Proprioception normal.   Pinprick normal.     Gait, Coordination, and Reflexes     Gait  Gait: circumduction (right leg )    Tremor   Resting tremor: absent  Intention tremor: absent  Action tremor: absent    Reflexes   Reflexes 2+ except as noted. Decreased arm swing on R arm        Physical Exam   Constitutional: He appears well-developed and well-nourished.   Nursing note and vitals reviewed.      Lab on 06/03/2019   Component Date Value Ref Range Status   • FERMÍN-65 06/03/2019 <5.0  0.0 - 5.0 U/mL Final         Assessment/Plan     Problem List Items Addressed This Visit        Nervous and Auditory    Numbness of fingers of both hands    Numbness and tingling of both legs - Primary    Current Assessment & Plan     Mild improvement with Sinemet    Increase Sinemet 25/100 2 tabs TID                   No Follow-up on file.

## 2021-04-02 ENCOUNTER — BULK ORDERING (OUTPATIENT)
Dept: CASE MANAGEMENT | Facility: OTHER | Age: 41
End: 2021-04-02

## 2021-04-02 DIAGNOSIS — Z23 IMMUNIZATION DUE: ICD-10-CM
